# Patient Record
Sex: FEMALE | Race: OTHER | URBAN - METROPOLITAN AREA
[De-identification: names, ages, dates, MRNs, and addresses within clinical notes are randomized per-mention and may not be internally consistent; named-entity substitution may affect disease eponyms.]

---

## 2017-03-04 ENCOUNTER — OUTPATIENT (OUTPATIENT)
Dept: OUTPATIENT SERVICES | Facility: HOSPITAL | Age: 47
LOS: 1 days | End: 2017-03-04
Payer: MEDICAID

## 2017-03-04 ENCOUNTER — APPOINTMENT (OUTPATIENT)
Dept: ULTRASOUND IMAGING | Facility: CLINIC | Age: 47
End: 2017-03-04

## 2017-03-04 DIAGNOSIS — Z00.8 ENCOUNTER FOR OTHER GENERAL EXAMINATION: ICD-10-CM

## 2017-03-16 DIAGNOSIS — N60.11 DIFFUSE CYSTIC MASTOPATHY OF RIGHT BREAST: ICD-10-CM

## 2017-03-16 DIAGNOSIS — K80.20 CALCULUS OF GALLBLADDER WITHOUT CHOLECYSTITIS WITHOUT OBSTRUCTION: ICD-10-CM

## 2017-04-21 PROCEDURE — 76642 ULTRASOUND BREAST LIMITED: CPT

## 2017-04-21 PROCEDURE — 76700 US EXAM ABDOM COMPLETE: CPT

## 2017-09-20 ENCOUNTER — APPOINTMENT (OUTPATIENT)
Dept: ULTRASOUND IMAGING | Facility: CLINIC | Age: 47
End: 2017-09-20
Payer: MEDICAID

## 2017-09-20 ENCOUNTER — APPOINTMENT (OUTPATIENT)
Dept: MAMMOGRAPHY | Facility: CLINIC | Age: 47
End: 2017-09-20
Payer: MEDICAID

## 2017-09-20 ENCOUNTER — OUTPATIENT (OUTPATIENT)
Dept: OUTPATIENT SERVICES | Facility: HOSPITAL | Age: 47
LOS: 1 days | End: 2017-09-20
Payer: MEDICAID

## 2017-09-20 DIAGNOSIS — Z00.8 ENCOUNTER FOR OTHER GENERAL EXAMINATION: ICD-10-CM

## 2017-09-20 PROCEDURE — G0202: CPT | Mod: 26

## 2017-09-20 PROCEDURE — 77067 SCR MAMMO BI INCL CAD: CPT

## 2017-09-20 PROCEDURE — 76641 ULTRASOUND BREAST COMPLETE: CPT | Mod: 26,50

## 2017-09-20 PROCEDURE — 77063 BREAST TOMOSYNTHESIS BI: CPT | Mod: 26

## 2017-09-20 PROCEDURE — 76641 ULTRASOUND BREAST COMPLETE: CPT

## 2017-09-20 PROCEDURE — 77063 BREAST TOMOSYNTHESIS BI: CPT

## 2017-09-25 DIAGNOSIS — N60.02 SOLITARY CYST OF LEFT BREAST: ICD-10-CM

## 2017-09-25 DIAGNOSIS — N60.01 SOLITARY CYST OF RIGHT BREAST: ICD-10-CM

## 2017-09-25 DIAGNOSIS — Z12.31 ENCOUNTER FOR SCREENING MAMMOGRAM FOR MALIGNANT NEOPLASM OF BREAST: ICD-10-CM

## 2017-09-29 ENCOUNTER — APPOINTMENT (OUTPATIENT)
Dept: MAMMOGRAPHY | Facility: CLINIC | Age: 47
End: 2017-09-29
Payer: MEDICAID

## 2017-09-29 ENCOUNTER — APPOINTMENT (OUTPATIENT)
Dept: ULTRASOUND IMAGING | Facility: CLINIC | Age: 47
End: 2017-09-29
Payer: MEDICAID

## 2017-09-29 ENCOUNTER — OUTPATIENT (OUTPATIENT)
Dept: OUTPATIENT SERVICES | Facility: HOSPITAL | Age: 47
LOS: 1 days | End: 2017-09-29
Payer: MEDICAID

## 2017-09-29 DIAGNOSIS — Z00.8 ENCOUNTER FOR OTHER GENERAL EXAMINATION: ICD-10-CM

## 2017-09-29 PROCEDURE — 77065 DX MAMMO INCL CAD UNI: CPT

## 2017-09-29 PROCEDURE — 76642 ULTRASOUND BREAST LIMITED: CPT | Mod: 26,LT

## 2017-09-29 PROCEDURE — G0206: CPT | Mod: 26,LT

## 2017-09-29 PROCEDURE — G0279: CPT | Mod: 26

## 2017-09-29 PROCEDURE — 76642 ULTRASOUND BREAST LIMITED: CPT

## 2017-09-29 PROCEDURE — G0279: CPT

## 2018-01-15 ENCOUNTER — EMERGENCY (EMERGENCY)
Facility: HOSPITAL | Age: 48
LOS: 1 days | Discharge: HOME | End: 2018-01-15
Attending: EMERGENCY MEDICINE
Payer: MEDICAID

## 2018-01-15 VITALS
DIASTOLIC BLOOD PRESSURE: 83 MMHG | RESPIRATION RATE: 18 BRPM | TEMPERATURE: 99 F | HEART RATE: 70 BPM | SYSTOLIC BLOOD PRESSURE: 163 MMHG | HEIGHT: 62 IN | WEIGHT: 177.03 LBS | OXYGEN SATURATION: 96 %

## 2018-01-15 PROCEDURE — 99283 EMERGENCY DEPT VISIT LOW MDM: CPT | Mod: 25

## 2018-01-15 PROCEDURE — 90715 TDAP VACCINE 7 YRS/> IM: CPT

## 2018-01-15 PROCEDURE — 99284 EMERGENCY DEPT VISIT MOD MDM: CPT

## 2018-01-15 PROCEDURE — T1013: CPT

## 2018-01-15 PROCEDURE — 90471 IMMUNIZATION ADMIN: CPT

## 2018-01-15 RX ORDER — MUPIROCIN 20 MG/G
1 OINTMENT TOPICAL
Qty: 1 | Refills: 0 | OUTPATIENT
Start: 2018-01-15 | End: 2018-01-19

## 2018-01-15 RX ORDER — TETANUS TOXOID, REDUCED DIPHTHERIA TOXOID AND ACELLULAR PERTUSSIS VACCINE, ADSORBED 5; 2.5; 8; 8; 2.5 [IU]/.5ML; [IU]/.5ML; UG/.5ML; UG/.5ML; UG/.5ML
0.5 SUSPENSION INTRAMUSCULAR ONCE
Qty: 0 | Refills: 0 | Status: COMPLETED | OUTPATIENT
Start: 2018-01-15 | End: 2018-01-15

## 2018-01-15 RX ADMIN — TETANUS TOXOID, REDUCED DIPHTHERIA TOXOID AND ACELLULAR PERTUSSIS VACCINE, ADSORBED 0.5 MILLILITER(S): 5; 2.5; 8; 8; 2.5 SUSPENSION INTRAMUSCULAR at 15:53

## 2018-01-15 RX ADMIN — Medication 1 TABLET(S): at 15:53

## 2018-01-15 NOTE — ED STATDOCS - MEDICAL DECISION MAKING DETAILS
Pt with superficial p[uncture wound to left thigh from dog bite. Will irrigate and apply bacitracin and dressing, abx. Wound repaired. [facility] called. Pt to be discharged home with PO abx. Follow up with PMD Wound repaired. [facility] called. Pt to be discharged home with PO abx. Follow up with PMD. Pt left without signing discharge paperwork.

## 2018-01-15 NOTE — ED STATDOCS - OBJECTIVE STATEMENT
48 yo F presents to ED c/o dog bite to left thigh. Pt was walking into her home when her landlord's dog attacked her and bit her in her left thigh. Pt states dog is UTD on vaccinations. Td is not UTD. NKDA. No [facility] report filed. No other acute complaints.

## 2018-01-15 NOTE — ED ADULT NURSE NOTE - CAS EDN DISCHARGE ASSESSMENT
Alert and oriented to person, place and time/Awake/No adverse reaction to first time med in ED/Dressing clean and dry/Patient baseline mental status

## 2018-01-15 NOTE — ED STATDOCS - ATTENDING CONTRIBUTION TO CARE
I, Hannah Goel, performed the initial face to face bedside interview with this patient regarding history of present illness, review of symptoms and relevant past medical, social and family history.  I completed an independent physical examination.  I was the initial provider who evaluated this patient. I have signed out the follow up of any pending tests (i.e. labs, radiological studies) to the ACP.  I have communicated the patient’s plan of care and disposition with the ACP.

## 2018-01-15 NOTE — ED ADULT TRIAGE NOTE - CHIEF COMPLAINT QUOTE
"I was bit by a dog on my left upper thigh and I need to be seen " Pt knows the dog, it's their landlord's dog. The dog was vaccinated after he bit the pt's son.

## 2018-01-15 NOTE — ED ADULT NURSE NOTE - OBJECTIVE STATEMENT
Pt A&OX3, pt state as she was bitten by a large dog that lives in her own home.  Pants were clear cut and pt has a puncture wound on left thigh.

## 2018-01-15 NOTE — ED ADULT NURSE NOTE - DISCHARGE TEACHING
Pt actually left before being given discharge paperwork.  Pt was made aware multiple times that she would have prescriptions waiting at her pharmacy.

## 2018-01-15 NOTE — ED STATDOCS - PROGRESS NOTE DETAILS
Pt presents with dog bite to left anterior thigh. Pt states landlords dog bit her. Dog up to date with vaccines as per pt. Pt denies all other medical complaints. N/v, fever, chills, chest pain, sob, abdomina pain, numbness, tingling.     PE: General: NAD  CV: RRR, no m/r/g. Lungs: CTA b/l, no use of accessory muscles, no wheezes, rales, rhonchi. Skin: abrasion to left anterior thigh, no open wound. Abdomen: Normal BS, soft, NT/ND. Extremities: no edema, cyanosis, neurovascularly intact.     A/P: Wound was irrigated with normal saline, cleaned with chlorahexadine, bacitracin and bandage applied. [facility] called. They will follow up with pt outpatient. MARTINEZ with nathalia.

## 2018-01-18 ENCOUNTER — EMERGENCY (EMERGENCY)
Facility: HOSPITAL | Age: 48
LOS: 1 days | Discharge: HOME | End: 2018-01-18
Attending: EMERGENCY MEDICINE | Admitting: EMERGENCY MEDICINE
Payer: MEDICAID

## 2018-01-18 VITALS
HEART RATE: 104 BPM | SYSTOLIC BLOOD PRESSURE: 152 MMHG | HEIGHT: 62 IN | OXYGEN SATURATION: 100 % | WEIGHT: 175.93 LBS | DIASTOLIC BLOOD PRESSURE: 74 MMHG | TEMPERATURE: 98 F | RESPIRATION RATE: 20 BRPM

## 2018-01-18 PROCEDURE — 99283 EMERGENCY DEPT VISIT LOW MDM: CPT

## 2018-01-18 PROCEDURE — T1013: CPT

## 2018-01-18 PROCEDURE — 99282 EMERGENCY DEPT VISIT SF MDM: CPT

## 2018-01-18 NOTE — ED STATDOCS - MEDICAL DECISION MAKING DETAILS
Physical exam is unremarkable and pt is currently afebrile assured pt it is unlikely secondary to dog bite, will DC home with out patient PCP follow up.

## 2018-01-18 NOTE — ED ADULT TRIAGE NOTE - CHIEF COMPLAINT QUOTE
Patient arrived to ED today with c/o fever for two days and headache after being bitten by a dog three days ago.

## 2018-01-18 NOTE — ED STATDOCS - CONDITION AT DISCHARGE:
lactated ringers, aluminum hydroxide/magnesium hydroxide/simethicone, Levsin, Zofran, Protonix, Compazine Satisfactory

## 2018-01-18 NOTE — ED STATDOCS - OBJECTIVE STATEMENT
This is a 46 y/o F presents to ED c/o fever, HA and palpitations. Pt reports she was in ED 4 days ago due to obtaining a dog bite, received tetanus shot. Yesterday awoke with HA and fever. Is here due to wanting assurance that it is not secondary to dog bite incident. Notes yesterday and the day before yesterday felt palpitations. Dogs vaccinations UTD. Denies N/V/D, chills, SOB, CP, difficulty breathing, numbness, tingling and abd pain.  : Skip

## 2018-03-01 ENCOUNTER — EMERGENCY (EMERGENCY)
Facility: HOSPITAL | Age: 48
LOS: 1 days | Discharge: HOME | End: 2018-03-01
Attending: EMERGENCY MEDICINE | Admitting: EMERGENCY MEDICINE
Payer: MEDICAID

## 2018-03-01 VITALS — WEIGHT: 169.98 LBS | HEIGHT: 62 IN

## 2018-03-01 VITALS
OXYGEN SATURATION: 98 % | SYSTOLIC BLOOD PRESSURE: 145 MMHG | HEART RATE: 78 BPM | RESPIRATION RATE: 20 BRPM | DIASTOLIC BLOOD PRESSURE: 78 MMHG | TEMPERATURE: 98 F

## 2018-03-01 LAB
APPEARANCE UR: CLEAR — SIGNIFICANT CHANGE UP
BACTERIA # UR AUTO: ABNORMAL
BILIRUB UR-MCNC: NEGATIVE — SIGNIFICANT CHANGE UP
COLOR SPEC: SIGNIFICANT CHANGE UP
DIFF PNL FLD: ABNORMAL
EPI CELLS # UR: SIGNIFICANT CHANGE UP
GLUCOSE UR QL: NEGATIVE MG/DL — SIGNIFICANT CHANGE UP
KETONES UR-MCNC: NEGATIVE — SIGNIFICANT CHANGE UP
LEUKOCYTE ESTERASE UR-ACNC: NEGATIVE — SIGNIFICANT CHANGE UP
NITRITE UR-MCNC: NEGATIVE — SIGNIFICANT CHANGE UP
PH UR: 7 — SIGNIFICANT CHANGE UP (ref 5–8)
PROT UR-MCNC: NEGATIVE MG/DL — SIGNIFICANT CHANGE UP
RBC CASTS # UR COMP ASSIST: SIGNIFICANT CHANGE UP /HPF (ref 0–4)
SP GR SPEC: 1 — LOW (ref 1.01–1.02)
UROBILINOGEN FLD QL: NEGATIVE MG/DL — SIGNIFICANT CHANGE UP
WBC UR QL: SIGNIFICANT CHANGE UP

## 2018-03-01 PROCEDURE — 94640 AIRWAY INHALATION TREATMENT: CPT

## 2018-03-01 PROCEDURE — 81001 URINALYSIS AUTO W/SCOPE: CPT

## 2018-03-01 PROCEDURE — 71046 X-RAY EXAM CHEST 2 VIEWS: CPT | Mod: 26

## 2018-03-01 PROCEDURE — 99284 EMERGENCY DEPT VISIT MOD MDM: CPT

## 2018-03-01 PROCEDURE — T1013: CPT

## 2018-03-01 PROCEDURE — 71046 X-RAY EXAM CHEST 2 VIEWS: CPT

## 2018-03-01 PROCEDURE — 99283 EMERGENCY DEPT VISIT LOW MDM: CPT | Mod: 25

## 2018-03-01 RX ORDER — ALBUTEROL 90 UG/1
2 AEROSOL, METERED ORAL ONCE
Qty: 0 | Refills: 0 | Status: COMPLETED | OUTPATIENT
Start: 2018-03-01 | End: 2018-03-01

## 2018-03-01 RX ADMIN — ALBUTEROL 2 PUFF(S): 90 AEROSOL, METERED ORAL at 17:31

## 2018-03-01 NOTE — ED STATDOCS - OBJECTIVE STATEMENT
48 y/o F pt with hx of hysterectomy presents to c/o subjective fever since yesterday. She is also c/o mild cough, b/l hip and lower back pain, SOB. Pt took ibuprofen today 4 hours ago. Pt also states she has frequent urination. Denies rhinorrhea, productive cough, abdominal pain, vomiting, diarrhea, dysuria, recent travel. Pt denies PMHx and does not take any medications daily. No sick contacts. Nonsmoker, no EtOH.  PCP: Dr. Kami Soto in [place]

## 2020-08-10 NOTE — ED STATDOCS - MEDICAL DECISION MAKING DETAILS
well appearing 46 y/o F presenting with likely mild viral infection, possibly causing bronchitis. Plan chest x-ray to r/o PNA, urinalysis, and symptomatic treatment with albuterol Cheek To Nose Interpolation Flap Division And Inset Text: Division and inset of the cheek to nose interpolation flap was performed to achieve optimal aesthetic result, restore normal anatomic appearance and avoid distortion of normal anatomy, expedite and facilitate wound healing, achieve optimal functional result and because linear closure either not possible or would produce suboptimal result. The patient was prepped and draped in the usual manner. The pedicle was infiltrated with local anesthesia. The pedicle was sectioned with a #15 blade. The pedicle was de-bulked and trimmed to match the shape of the defect. Hemostasis was achieved. The flap donor site and free margin of the flap were secured with deep buried sutures and the wound edges were re-approximated.

## 2022-06-16 PROBLEM — Z00.00 ENCOUNTER FOR PREVENTIVE HEALTH EXAMINATION: Status: ACTIVE | Noted: 2022-06-16

## 2022-09-14 ENCOUNTER — APPOINTMENT (OUTPATIENT)
Dept: BARIATRICS/WEIGHT MGMT | Facility: CLINIC | Age: 52
End: 2022-09-14

## 2022-11-18 ENCOUNTER — APPOINTMENT (OUTPATIENT)
Dept: BARIATRICS/WEIGHT MGMT | Facility: CLINIC | Age: 52
End: 2022-11-18

## 2023-07-26 ENCOUNTER — APPOINTMENT (OUTPATIENT)
Dept: BARIATRICS/WEIGHT MGMT | Facility: CLINIC | Age: 53
End: 2023-07-26

## 2023-08-13 NOTE — ED ADULT NURSE NOTE - INTEGUMENTARY WDL
Alert-The patient is alert, awake and responds to voice. The patient is oriented to time, place, and person. The triage nurse is able to obtain subjective information.
Color consistent with ethnicity/race, warm, dry intact, resilient.

## 2023-12-13 ENCOUNTER — APPOINTMENT (OUTPATIENT)
Dept: BARIATRICS/WEIGHT MGMT | Facility: CLINIC | Age: 53
End: 2023-12-13

## 2025-01-20 ENCOUNTER — OFFICE (OUTPATIENT)
Dept: URBAN - METROPOLITAN AREA CLINIC 94 | Facility: CLINIC | Age: 55
Setting detail: OPHTHALMOLOGY
End: 2025-01-20
Payer: MEDICAID

## 2025-01-20 DIAGNOSIS — H35.363: ICD-10-CM

## 2025-01-20 DIAGNOSIS — H40.033: ICD-10-CM

## 2025-01-20 DIAGNOSIS — H11.32: ICD-10-CM

## 2025-01-20 DIAGNOSIS — H35.033: ICD-10-CM

## 2025-01-20 DIAGNOSIS — H26.493: ICD-10-CM

## 2025-01-20 PROBLEM — H43.393 VITREOUS FLOATERS; BOTH EYES: Status: ACTIVE | Noted: 2025-01-20

## 2025-01-20 PROCEDURE — 92250 FUNDUS PHOTOGRAPHY W/I&R: CPT | Performed by: PHYSICIAN ASSISTANT

## 2025-01-20 PROCEDURE — 92014 COMPRE OPH EXAM EST PT 1/>: CPT | Performed by: PHYSICIAN ASSISTANT

## 2025-01-20 ASSESSMENT — REFRACTION_CURRENTRX
OS_SPHERE: +2.00
OS_ADD: +2.25
OD_CYLINDER: SPH
OD_SPHERE: +2.25
OS_OVR_VA: 20/
OD_VPRISM_DIRECTION: SV
OD_CYLINDER: -0.25
OD_ADD: +2.25
OS_VPRISM_DIRECTION: PROGS
OD_OVR_VA: 20/
OD_VPRISM_DIRECTION: PROGS
OS_VPRISM_DIRECTION: SV
OS_CYLINDER: 0.00
OS_AXIS: 006
OS_AXIS: 000
OD_OVR_VA: 20/
OS_CYLINDER: -0.75
OS_OVR_VA: 20/
OS_SPHERE: +0.50
OD_SPHERE: +0.50
OD_AXIS: 033

## 2025-01-20 ASSESSMENT — REFRACTION_MANIFEST
OD_ADD: +2.25
OS_VA2: 20/20
OS_AXIS: 006
OD_AXIS: 167
OD_VA2: 20/20
OS_VA1: 20/20
OS_CYLINDER: -0.75
OS_ADD: +2.25
OD_CYLINDER: -0.50
OS_VA1: 20/20
OD_VA1: 20/20
OS_SPHERE: +0.50
OD_SPHERE: +0.50
OD_VA1: 20/30
OD_SPHERE: +1.25
OU_VA: 20/20
OS_AXIS: 013
OS_CYLINDER: -0.50
OS_SPHERE: -0.25
OD_CYLINDER: SPH

## 2025-01-20 ASSESSMENT — CONFRONTATIONAL VISUAL FIELD TEST (CVF)
OD_FINDINGS: FULL
OS_FINDINGS: FULL

## 2025-01-20 ASSESSMENT — KERATOMETRY
OD_K1POWER_DIOPTERS: 45.75
OS_K2POWER_DIOPTERS: 46.00
OS_AXISANGLE_DEGREES: 112
OD_K2POWER_DIOPTERS: 46.25
METHOD_AUTO_MANUAL: AUTO
OD_AXISANGLE_DEGREES: 089
OS_K1POWER_DIOPTERS: 45.25

## 2025-01-20 ASSESSMENT — SUPERFICIAL PUNCTATE KERATITIS (SPK)
OS_SPK: 1+
OD_SPK: 1+

## 2025-01-20 ASSESSMENT — REFRACTION_AUTOREFRACTION
OS_CYLINDER: -0.75
OD_AXIS: 011
OS_AXIS: 031
OD_SPHERE: +0.25
OD_CYLINDER: -0.50
OS_SPHERE: -0.25

## 2025-01-20 ASSESSMENT — VISUAL ACUITY
OD_BCVA: 20/30
OS_BCVA: 20/20

## 2025-04-11 ENCOUNTER — OFFICE (OUTPATIENT)
Dept: URBAN - METROPOLITAN AREA CLINIC 94 | Facility: CLINIC | Age: 55
Setting detail: OPHTHALMOLOGY
End: 2025-04-11
Payer: MEDICAID

## 2025-04-11 DIAGNOSIS — H26.491: ICD-10-CM

## 2025-04-11 PROCEDURE — 66821 AFTER CATARACT LASER SURGERY: CPT | Mod: RT | Performed by: OPHTHALMOLOGY

## 2025-04-11 ASSESSMENT — REFRACTION_CURRENTRX
OD_VPRISM_DIRECTION: SV
OS_AXIS: 006
OD_ADD: +2.25
OD_VPRISM_DIRECTION: PROGS
OS_SPHERE: +2.00
OS_CYLINDER: -0.75
OD_SPHERE: +0.50
OS_OVR_VA: 20/
OD_AXIS: 033
OS_AXIS: 000
OS_VPRISM_DIRECTION: PROGS
OD_SPHERE: +2.25
OD_OVR_VA: 20/
OS_VPRISM_DIRECTION: SV
OS_OVR_VA: 20/
OD_CYLINDER: SPH
OS_ADD: +2.25
OD_CYLINDER: -0.25
OD_OVR_VA: 20/
OS_CYLINDER: 0.00
OS_SPHERE: +0.50

## 2025-04-11 ASSESSMENT — REFRACTION_AUTOREFRACTION
OS_CYLINDER: -0.75
OD_AXIS: 011
OD_SPHERE: +0.25
OS_AXIS: 031
OD_CYLINDER: -0.50
OS_SPHERE: -0.25

## 2025-04-11 ASSESSMENT — KERATOMETRY
OS_K2POWER_DIOPTERS: 46.00
OD_K2POWER_DIOPTERS: 46.25
OS_K1POWER_DIOPTERS: 45.25
OS_AXISANGLE_DEGREES: 112
OD_AXISANGLE_DEGREES: 089
OD_K1POWER_DIOPTERS: 45.75
METHOD_AUTO_MANUAL: AUTO

## 2025-04-11 ASSESSMENT — REFRACTION_MANIFEST
OD_ADD: +2.25
OD_SPHERE: +0.50
OS_SPHERE: +0.50
OS_AXIS: 006
OS_AXIS: 013
OS_VA1: 20/20
OS_CYLINDER: -0.50
OU_VA: 20/20
OS_VA1: 20/20
OS_SPHERE: -0.25
OS_CYLINDER: -0.75
OD_SPHERE: +1.25
OD_VA1: 20/20
OD_CYLINDER: SPH
OS_ADD: +2.25
OD_AXIS: 167
OD_VA1: 20/30
OS_VA2: 20/20
OD_CYLINDER: -0.50
OD_VA2: 20/20

## 2025-04-11 ASSESSMENT — VISUAL ACUITY
OS_BCVA: 20/20
OD_BCVA: 20/30

## 2025-04-25 ENCOUNTER — ASC (OUTPATIENT)
Dept: URBAN - METROPOLITAN AREA SURGERY 8 | Facility: SURGERY | Age: 55
Setting detail: OPHTHALMOLOGY
End: 2025-04-25
Payer: MEDICAID

## 2025-04-25 DIAGNOSIS — H26.492: ICD-10-CM

## 2025-04-25 PROBLEM — H26.493 POSTERIOR CAPSULAR OPACIFICATION; LEFT EYE, BOTH EYES: Status: ACTIVE | Noted: 2025-04-25

## 2025-04-25 PROCEDURE — 66821 AFTER CATARACT LASER SURGERY: CPT | Mod: 79,LT | Performed by: OPHTHALMOLOGY

## 2025-04-25 ASSESSMENT — KERATOMETRY
OS_AXISANGLE_DEGREES: 112
OD_K1POWER_DIOPTERS: 45.75
METHOD_AUTO_MANUAL: AUTO
OD_K2POWER_DIOPTERS: 46.25
OS_K1POWER_DIOPTERS: 45.25
OD_AXISANGLE_DEGREES: 089
OS_K2POWER_DIOPTERS: 46.00

## 2025-04-25 ASSESSMENT — REFRACTION_MANIFEST
OD_CYLINDER: SPH
OS_ADD: +2.25
OS_VA1: 20/20
OD_ADD: +2.25
OS_SPHERE: +0.50
OD_VA2: 20/20
OD_VA1: 20/30
OD_CYLINDER: -0.50
OD_SPHERE: +1.25
OD_VA1: 20/20
OS_AXIS: 006
OU_VA: 20/20
OD_AXIS: 167
OS_VA2: 20/20
OS_AXIS: 013
OS_CYLINDER: -0.50
OS_CYLINDER: -0.75
OS_SPHERE: -0.25
OS_VA1: 20/20
OD_SPHERE: +0.50

## 2025-04-25 ASSESSMENT — REFRACTION_AUTOREFRACTION
OS_AXIS: 031
OD_SPHERE: +0.25
OD_CYLINDER: -0.50
OD_AXIS: 011
OS_CYLINDER: -0.75
OS_SPHERE: -0.25

## 2025-04-25 ASSESSMENT — REFRACTION_CURRENTRX
OD_OVR_VA: 20/
OS_VPRISM_DIRECTION: PROGS
OS_OVR_VA: 20/
OD_VPRISM_DIRECTION: SV
OS_CYLINDER: 0.00
OD_SPHERE: +2.25
OS_OVR_VA: 20/
OS_SPHERE: +0.50
OD_SPHERE: +0.50
OS_AXIS: 000
OD_CYLINDER: -0.25
OD_ADD: +2.25
OS_ADD: +2.25
OS_VPRISM_DIRECTION: SV
OS_SPHERE: +2.00
OS_CYLINDER: -0.75
OD_OVR_VA: 20/
OD_VPRISM_DIRECTION: PROGS
OD_CYLINDER: SPH
OD_AXIS: 033
OS_AXIS: 006

## 2025-04-25 ASSESSMENT — VISUAL ACUITY
OS_BCVA: 20/20
OD_BCVA: 20/30

## 2025-04-25 ASSESSMENT — SUPERFICIAL PUNCTATE KERATITIS (SPK)
OS_SPK: 1+
OD_SPK: 1+

## 2025-06-13 ENCOUNTER — OFFICE (OUTPATIENT)
Dept: URBAN - METROPOLITAN AREA CLINIC 94 | Facility: CLINIC | Age: 55
Setting detail: OPHTHALMOLOGY
End: 2025-06-13
Payer: MEDICAID

## 2025-06-13 DIAGNOSIS — H40.033: ICD-10-CM

## 2025-06-13 DIAGNOSIS — H26.493: ICD-10-CM

## 2025-06-13 DIAGNOSIS — H26.492: ICD-10-CM

## 2025-06-13 PROBLEM — H11.32 SUBCONJUNCTIVAL HEMORRHAGE; LEFT EYE: Status: ACTIVE | Noted: 2025-06-13

## 2025-06-13 PROCEDURE — 99024 POSTOP FOLLOW-UP VISIT: CPT | Performed by: OPHTHALMOLOGY

## 2025-06-13 ASSESSMENT — REFRACTION_CURRENTRX
OD_OVR_VA: 20/
OS_SPHERE: +0.50
OS_ADD: +2.25
OD_VPRISM_DIRECTION: PROGS
OD_OVR_VA: 20/
OD_ADD: +2.25
OS_VPRISM_DIRECTION: PROGS
OD_SPHERE: +2.25
OS_OVR_VA: 20/
OS_SPHERE: +2.00
OD_CYLINDER: -0.25
OS_CYLINDER: 0.00
OD_AXIS: 033
OS_CYLINDER: -0.75
OS_VPRISM_DIRECTION: SV
OD_VPRISM_DIRECTION: SV
OS_AXIS: 006
OS_OVR_VA: 20/
OS_AXIS: 000
OD_CYLINDER: SPH
OD_SPHERE: +0.50

## 2025-06-13 ASSESSMENT — KERATOMETRY
OS_AXISANGLE_DEGREES: 125
OS_K1POWER_DIOPTERS: 45.50
METHOD_AUTO_MANUAL: AUTO
OD_K1POWER_DIOPTERS: 45.75
OS_K2POWER_DIOPTERS: 46.25
OD_AXISANGLE_DEGREES: 084
OD_K2POWER_DIOPTERS: 46.25

## 2025-06-13 ASSESSMENT — REFRACTION_MANIFEST
OS_AXIS: 006
OS_SPHERE: -0.25
OD_CYLINDER: -0.50
OS_ADD: +2.25
OD_ADD: +2.25
OD_AXIS: 167
OS_CYLINDER: -0.75
OS_VA2: 20/20
OD_SPHERE: +1.25
OS_VA1: 20/20
OD_VA2: 20/20
OD_VA1: 20/30
OD_CYLINDER: SPH
OS_VA1: 20/20
OD_VA1: 20/20
OS_AXIS: 013
OU_VA: 20/20
OS_SPHERE: +0.50
OS_CYLINDER: -0.50
OD_SPHERE: +0.50

## 2025-06-13 ASSESSMENT — REFRACTION_AUTOREFRACTION
OD_AXIS: 003
OD_CYLINDER: -0.25
OS_AXIS: 031
OS_CYLINDER: -1.00
OD_SPHERE: 0.00
OS_SPHERE: 0.00

## 2025-06-13 ASSESSMENT — SUPERFICIAL PUNCTATE KERATITIS (SPK)
OS_SPK: 1+
OD_SPK: 1+

## 2025-06-13 ASSESSMENT — VISUAL ACUITY
OS_BCVA: 20/20
OD_BCVA: 20/30

## 2025-06-13 ASSESSMENT — CONFRONTATIONAL VISUAL FIELD TEST (CVF)
OS_FINDINGS: FULL
OD_FINDINGS: FULL